# Patient Record
Sex: FEMALE | URBAN - METROPOLITAN AREA
[De-identification: names, ages, dates, MRNs, and addresses within clinical notes are randomized per-mention and may not be internally consistent; named-entity substitution may affect disease eponyms.]

---

## 2023-02-03 ENCOUNTER — APPOINTMENT (RX ONLY)
Dept: URBAN - METROPOLITAN AREA CLINIC 35 | Facility: CLINIC | Age: 53
Setting detail: DERMATOLOGY
End: 2023-02-03

## 2023-02-03 DIAGNOSIS — L98.8 OTHER SPECIFIED DISORDERS OF THE SKIN AND SUBCUTANEOUS TISSUE: ICD-10-CM

## 2023-02-03 PROCEDURE — ? BOTOX

## 2023-02-03 PROCEDURE — ? COUNSELING

## 2023-02-03 PROCEDURE — ? COSMETIC CONSULTATION: BOTOX

## 2023-02-03 ASSESSMENT — LOCATION ZONE DERM: LOCATION ZONE: FACE

## 2023-02-03 ASSESSMENT — LOCATION SIMPLE DESCRIPTION DERM: LOCATION SIMPLE: CHIN

## 2023-02-03 ASSESSMENT — LOCATION DETAILED DESCRIPTION DERM: LOCATION DETAILED: RIGHT CHIN

## 2023-02-03 NOTE — PROCEDURE: BOTOX
R Brow Units: 0
Show Ucl Units: No
Consent: Written consent obtained. Risks include but not limited to lid/brow ptosis, bruising, swelling, diplopia, temporary effect, incomplete chemical denervation.
Incrementing Botox Units: By 0.5 Units
Price (Use Numbers Only, No Special Characters Or $): 135
Show Additional Area 6: Yes
Post-Care Instructions: Patient instructed to not lie down for 4 hours and limit physical activity for 24 hours.
Mentalis Units: 9
Lot #: [id number]
Detail Level: Detailed
Dilution (U/0.1 Cc): 4
Reconstitution Date (Optional): 2/1/23
Expiration Date (Month Year): Feb 2025
Patient Specific Comments (Will Not Stick From Patient To Patient): 3 injection sites, 3 units each, on the chin

## 2023-08-09 ENCOUNTER — APPOINTMENT (RX ONLY)
Dept: URBAN - METROPOLITAN AREA CLINIC 35 | Facility: CLINIC | Age: 53
Setting detail: DERMATOLOGY
End: 2023-08-09

## 2023-08-09 DIAGNOSIS — L98.8 OTHER SPECIFIED DISORDERS OF THE SKIN AND SUBCUTANEOUS TISSUE: ICD-10-CM

## 2023-08-09 PROCEDURE — ? COUNSELING

## 2023-08-09 PROCEDURE — ? COSMETIC CONSULTATION: FILLERS

## 2023-08-09 PROCEDURE — ? MEDICAL CONSULTATION: BOTOX

## 2023-08-09 PROCEDURE — ? DIAGNOSIS COMMENT

## 2023-08-09 ASSESSMENT — LOCATION SIMPLE DESCRIPTION DERM: LOCATION SIMPLE: CHIN

## 2023-08-09 ASSESSMENT — LOCATION ZONE DERM: LOCATION ZONE: FACE

## 2023-08-09 ASSESSMENT — LOCATION DETAILED DESCRIPTION DERM: LOCATION DETAILED: RIGHT CHIN

## 2023-08-09 NOTE — PROCEDURE: DIAGNOSIS COMMENT
Detail Level: Simple
Render Risk Assessment In Note?: no
Comment: Discussed Botox vs. filler. Informed pt filler can last 6-12 months vs. 3-4 months for Botox. Pt opts to proceed with filler. Recommended providers in [place] for filler.

## 2023-08-10 ENCOUNTER — APPOINTMENT (RX ONLY)
Dept: URBAN - METROPOLITAN AREA CLINIC 35 | Facility: CLINIC | Age: 53
Setting detail: DERMATOLOGY
End: 2023-08-10

## 2023-08-10 DIAGNOSIS — L98.8 OTHER SPECIFIED DISORDERS OF THE SKIN AND SUBCUTANEOUS TISSUE: ICD-10-CM

## 2023-08-10 PROCEDURE — ? PHOTO-DOCUMENTATION

## 2023-08-10 PROCEDURE — ? COUNSELING

## 2023-08-10 PROCEDURE — ? BOTOX

## 2023-08-10 ASSESSMENT — LOCATION ZONE DERM: LOCATION ZONE: FACE

## 2023-08-10 ASSESSMENT — LOCATION DETAILED DESCRIPTION DERM: LOCATION DETAILED: RIGHT CHIN

## 2023-08-10 ASSESSMENT — LOCATION SIMPLE DESCRIPTION DERM: LOCATION SIMPLE: CHIN

## 2023-08-10 NOTE — PROCEDURE: MIPS QUALITY
Quality 130: Documentation Of Current Medications In The Medical Record: Current Medications Documented
Quality 431: Preventive Care And Screening: Unhealthy Alcohol Use - Screening: Patient not identified as an unhealthy alcohol user when screened for unhealthy alcohol use using a systematic screening method
Quality 110: Preventive Care And Screening: Influenza Immunization: Influenza Immunization not Administered for Documented Reasons.
Detail Level: Detailed
Quality 226: Preventive Care And Screening: Tobacco Use: Screening And Cessation Intervention: Patient screened for tobacco use and is an ex/non-smoker
Additional Notes: Flu shot not offered at clinic.

## 2023-08-10 NOTE — PROCEDURE: BOTOX
R Brow Units: 0
Show Ucl Units: No
Consent: Written consent obtained. Risks include but not limited to lid/brow ptosis, bruising, swelling, diplopia, temporary effect, incomplete chemical denervation.
Incrementing Botox Units: By 0.5 Units
Price (Use Numbers Only, No Special Characters Or $): 144
Show Additional Area 6: Yes
Post-Care Instructions: Patient instructed to not lie down for 4 hours and limit physical activity for 24 hours.
Mentalis Units: 9
Lot #: [id number]
Detail Level: Detailed
Dilution (U/0.1 Cc): 4
Reconstitution Date (Optional): 8/9/23
Expiration Date (Month Year): 05 2025
Patient Specific Comments (Will Not Stick From Patient To Patient): 3 injection sites, 3 units each, on the chin